# Patient Record
Sex: MALE | Race: WHITE
[De-identification: names, ages, dates, MRNs, and addresses within clinical notes are randomized per-mention and may not be internally consistent; named-entity substitution may affect disease eponyms.]

---

## 2019-11-11 ENCOUNTER — HOSPITAL ENCOUNTER (OUTPATIENT)
Dept: HOSPITAL 46 - OPS | Age: 74
Discharge: HOME | End: 2019-11-11
Attending: SURGERY
Payer: MEDICARE

## 2019-11-11 VITALS — WEIGHT: 227.01 LBS | HEIGHT: 71 IN | BODY MASS INDEX: 31.78 KG/M2

## 2019-11-11 DIAGNOSIS — D12.2: ICD-10-CM

## 2019-11-11 DIAGNOSIS — K57.30: ICD-10-CM

## 2019-11-11 DIAGNOSIS — E78.00: ICD-10-CM

## 2019-11-11 DIAGNOSIS — C85.90: ICD-10-CM

## 2019-11-11 DIAGNOSIS — E66.01: ICD-10-CM

## 2019-11-11 DIAGNOSIS — Z98.890: ICD-10-CM

## 2019-11-11 DIAGNOSIS — Z86.010: ICD-10-CM

## 2019-11-11 DIAGNOSIS — M10.9: ICD-10-CM

## 2019-11-11 DIAGNOSIS — Z85.6: ICD-10-CM

## 2019-11-11 DIAGNOSIS — I10: ICD-10-CM

## 2019-11-11 DIAGNOSIS — D12.3: ICD-10-CM

## 2019-11-11 DIAGNOSIS — Z12.11: Primary | ICD-10-CM

## 2019-11-11 PROCEDURE — 0DBK8ZZ EXCISION OF ASCENDING COLON, VIA NATURAL OR ARTIFICIAL OPENING ENDOSCOPIC: ICD-10-PCS | Performed by: SURGERY

## 2019-11-11 PROCEDURE — 0DBL8ZZ EXCISION OF TRANSVERSE COLON, VIA NATURAL OR ARTIFICIAL OPENING ENDOSCOPIC: ICD-10-PCS | Performed by: SURGERY

## 2019-11-11 PROCEDURE — 3E0H8GC INTRODUCTION OF OTHER THERAPEUTIC SUBSTANCE INTO LOWER GI, VIA NATURAL OR ARTIFICIAL OPENING ENDOSCOPIC: ICD-10-PCS | Performed by: SURGERY

## 2019-11-11 PROCEDURE — G0500 MOD SEDAT ENDO SERVICE >5YRS: HCPCS

## 2019-11-11 NOTE — OR
[facility]
                                    [address], [place]  [address]
_________________________________________________________________________________________
                                                                 Signed   
 
 
DATE OF OPERATION:
11/11/2019
 
SURGEON:
Lee Yu MD
 
PREOPERATIVE DIAGNOSES:
1. History of polyps.
2. History of chronic lymphocytic leukemia.
 
POSTOPERATIVE DIAGNOSES:
1. Sigmoid diverticulosis.
2. Polyps x2.
 
PROCEDURE:
Total colonoscopy to cecum with mucosal lift, snare polypectomy of right colon polyp
including Endomark tattoo dye and cold snare polypectomy with hemoclip application in
transverse colon. 
 
ANESTHESIA:
Intravenous sedation, fentanyl 100 mcg, Versed 5 mg.
 
INDICATION:
This 73-year-old white man is a patient of Dr. Sewell in [place], also Dr. Madden and
Dr. Ortega.  He is known to have chronic lymphocytic leukemia.  He underwent
colonoscopy in 2016, at which time, adenomatous polyps were excised as well as
hyperplastic polyp.  He is symptom free currently.  He is admitted to undergo
surveillance colonoscopy, understanding the risks of bleeding, infection, and
perforation. 
 
FINDINGS:
The prep was good.  Complete colonoscopy was undertaken to the cecum.  There were
numerous diverticula of sigmoid and left colon.  There was a remnant of a polyp from
prior polypectomy in the right colon, where submucosal tattoo dye was noted.  This was
excised completely with mucosal lift technique as well.  Another polyp was noted in the
transverse colon, excised with cold snare polypectomy technique.  Additionally, hemoclip
applied for hemostasis.  There were no other findings of concern. 
 
DESCRIPTION OF PROCEDURE:
The patient was brought to the endoscopy suite and placed in lateral decubitus position,
given intravenous sedation to the point of slurred speech and nystagmus.  Digital rectal
examination was normal. 
 
    Electronically Signed By: LEE YU MD  11/11/19 2047
_________________________________________________________________________________________
PATIENT NAME:     LEE DELGADO                   
MEDICAL RECORD #: [id number]            OPERATIVE REPORT              
          ACCT #: [id number]  
DATE OF BIRTH:   [birth year]            REPORT #: [id number]      
PHYSICIAN:        LEE YU MD                 
PCP:              ANDREI MADDEN MD            
REPORT IS CONFIDENTIAL AND NOT TO BE RELEASED WITHOUT AUTHORIZATION
 
 
                                  [facility]
                                    [address], [place]  [address]
_________________________________________________________________________________________
                                                                 Signed   
 
 
 
An Olympus video colonoscope was passed in the rectum and manipulated throughout the
colon noting diverticula of the sigmoid and left colon.  The scope was advanced beyond
the hepatic flexure to the right colon with visualization of the cecum.  In the mid
ascending colon was a tattoo gustavo.  It appeared from prior colonoscopy and a polyp
remnant there.  This was large enough that snare polypectomy would be appropriate.
Using Endo mucosal lift technique including Endomark tattoo dye, the mucosa was lifted
and using hot snare polypectomy technique, the polyp excised completely.  This was
grasped and withdrawn through the colon and ultimately offloaded. 
 
The scope was reintroduced and passed again and another small polyp was noted upon
withdrawal in the transverse colon.  This was too large for simple biopsy and therefore,
cold snare polypectomy technique was undertaken.  There was oozing of blood and a
hemoclip was applied.  The polyp was retrieved for pathology as well.  The remaining
colon was examined showing only diverticulosis.  Retroflexed view was normal except for
internal hemorrhoids.  The patient tolerated procedure well, was taken to recovery room
in good condition. 
 
CONCLUDING DIAGNOSIS:
Polyps x2 and diverticulosis.
 
PLAN:
Recommend Citrucel one scoop p.o. daily and repeat colonoscopy in 3 years sooner if
clinically indicated.  He will return to ongoing care of Dr. Sewell. 
 
 
 
            ________________________________________
            MD CLEVELAND Tovar/MODL
Job #:  [id number]
DD:  11/11/2019 15:18:39
DT:  11/11/2019 15:50:23
 
cc:            Dr. Melodie Ortega MD
 
 
    Electronically Signed By: LEE YU MD  11/11/19 2047
_________________________________________________________________________________________
PATIENT NAME:     LEE DELGADO                   
MEDICAL RECORD #: [id number]            OPERATIVE REPORT              
          ACCT #: [id number]  
DATE OF BIRTH:   [birth year]            REPORT #: [id number]      
PHYSICIAN:        LEE YU MD                 
PCP:              ANDREI MADDEN MD            
REPORT IS CONFIDENTIAL AND NOT TO BE RELEASED WITHOUT AUTHORIZATION
 
 
                                  [facility]
                                    [address], [place]  [address]
_________________________________________________________________________________________
                                                                 Signed   
 
 
Copies:  NERY ORTEGA MD
~
 
 
 
 
 
 
 
 
 
 
 
 
 
 
 
 
 
 
 
 
 
 
 
 
 
 
 
 
 
 
 
 
 
 
 
 
 
 
 
 
 
    Electronically Signed By: LEE YU MD  11/11/19 2047
_________________________________________________________________________________________
PATIENT NAME:     LEE DELGADO                   
MEDICAL RECORD #: [id number]            OPERATIVE REPORT              
          ACCT #: [id number]  
DATE OF BIRTH:   [birth year]            REPORT #: [id number]      
PHYSICIAN:        LEE YU MD                 
PCP:              ANDREI MADDEN MD            
REPORT IS CONFIDENTIAL AND NOT TO BE RELEASED WITHOUT AUTHORIZATION

## 2019-11-11 NOTE — NUR
11/11/19 1443 Sheets,Jocelin
1437 PT ARRIVED TO PACU DROWSY AND TALKING TO RN, PT DENIES NAUSEA AND
PAIN. RESP EVEN AND UNLABORED. VSS. PT FALLS ASLEEP EASILY AND SNORING
NOTED.
 
1439 O2 TRUNED OFF. ABD SOFT AND PT ENCOUARGED TO PASS GAS/AIR.

## 2019-11-12 NOTE — PATH
[facility]
                                    [address], [place]  [address]
_________________________________________________________________________________________
                                                                 Signed   
 
 
 
SPECIMEN(S): A ASCENDING POLYP
SPECIMEN(S): B TRANSVERSE POLYP
 
SPECIMEN SOURCE:
A. ASCENDING POLYP
B. TRANSVERSE POLYP
 
CLINICAL HISTORY:
History of polyps. Postop: Diverticulosis, polyps x2.
MICROSCOPIC DESCRIPTION:
Histologic sections of all submitted blocks are examined by light microscopy. 
These findings, together with the gross examination, support the pathologic 
diagnosis. 
 
FINAL PATHOLOGIC DIAGNOSIS:
A.  Mucosa, ascending colon, biopsy:
-  Villoglandular adenoma.
-  Negative for high-grade dysplasia.
B.  Mucosa, transverse colon, biopsy:
-  Tubular adenoma.
LJA:cml:C2NR
 
GROSS DESCRIPTION:
Two specimens are received in two containers, labeled "JS."
A.  The specimen, labeled "JS, ascending colon polyp," is received in formalin 
and consists of a single 1.0 cm brown polypoid nodule admixed with debris.  The 
specimen is inked and serially sectioned. 
Specimen is entirely submitted in cassette (A1).
B.  The specimen, labeled "GS, transverse colon polyp," is received in formalin 
and consists of a single 0.5 cm red-brown polypoid nodule. Specimen is entirely 
submitted in cassette (B1). 
AM (under the direct supervision of a pathologist)
The Gross Description was prepared using a voice recognition system.  The 
report was reviewed for accuracy; however, sound-alike word errors, addition 
and/or deletions may occur.  If there is any 
question about this report, please contact Client Services.
 
PERFORMING LABORATORY:
The technical component was performed by [facility], [address] [place] [id number] (Medical Director: Callie Burks MD; CLIA# [id number]). 
Professional interpretation was performed by 
 
                                                                                    
_________________________________________________________________________________________
PATIENT NAME:     LEE DELGADO                   
MEDICAL RECORD #: [id number]            PATHOLOGY                     
          ACCT #: [id number]       ACCESSION #: [id number]     
DATE OF BIRTH:   [birth year]            REPORT #: [id number]       
PHYSICIAN:        [facility]              
PCP:              ANDREI OLIVEIRA MD            
REPORT IS CONFIDENTIAL AND NOT TO BE RELEASED WITHOUT AUTHORIZATION
 
 
                                  [facility]
                                    [address], [place]  [address]
_________________________________________________________________________________________
                                                                 Signed   
 
 
[facility], [facility], [address] [address] (Medical Director:  Mike Taylor MD; CLIA# 
[id number]). 
 
Diagnostician:  Mike Taylor MD
Pathologist
Electronically Signed 11/12/2019
 
 
Copies:                                
~
 
 
 
 
 
 
 
 
 
 
 
 
 
 
 
 
 
 
 
 
 
 
 
 
 
 
 
 
 
 
 
 
                                                                                    
_________________________________________________________________________________________
PATIENT NAME:     LEE DELGADO                   
MEDICAL RECORD #: [id number]            PATHOLOGY                     
          ACCT #: [id number]       ACCESSION #: [id number]     
DATE OF BIRTH:   [birth year]            REPORT #: [id number]       
PHYSICIAN:        [facility]              
PCP:              ANDREI OLIVEIRA MD            
REPORT IS CONFIDENTIAL AND NOT TO BE RELEASED WITHOUT AUTHORIZATION